# Patient Record
Sex: FEMALE | Race: BLACK OR AFRICAN AMERICAN
[De-identification: names, ages, dates, MRNs, and addresses within clinical notes are randomized per-mention and may not be internally consistent; named-entity substitution may affect disease eponyms.]

---

## 2018-08-20 ENCOUNTER — HOSPITAL ENCOUNTER (EMERGENCY)
Dept: HOSPITAL 61 - ER | Age: 28
Discharge: HOME | End: 2018-08-20
Payer: COMMERCIAL

## 2018-08-20 VITALS — WEIGHT: 176.25 LBS | HEIGHT: 63 IN | BODY MASS INDEX: 31.23 KG/M2

## 2018-08-20 VITALS — DIASTOLIC BLOOD PRESSURE: 67 MMHG | SYSTOLIC BLOOD PRESSURE: 140 MMHG

## 2018-08-20 DIAGNOSIS — H11.31: Primary | ICD-10-CM

## 2018-08-20 PROCEDURE — 99281 EMR DPT VST MAYX REQ PHY/QHP: CPT

## 2018-08-20 NOTE — PHYS DOC
Past Medical History


Past Medical History:  No Pertinent History


Past Surgical History:  Other


Additional Past Surgical Histo:  breast reduction


Alcohol Use:  Rarely


Drug Use:  Marijuana





Adult General


Chief Complaint


Chief Complaint:  EYE PROBLEMS





HPI


HPI





Patient is a 28  year old female who presents with redness to the right eye. 

Patient reports about 5 days ago she was running around her house "acting stupid

". Patient reports her bifold closet door was leaning up against the wall and 

not attached. She reports when she went by she bumped it and it fell, striking 

her in the face. Patient reports when she woke up the next morning her eye was 

red. She denies any pain or blurred vision. []





Review of Systems


Review of Systems





Constitutional: Denies fever or chills []


Eyes: Denies change in visual acuity. Reports redness of the right eye. Denies 

eye pain []


Cardiovascular: No additional information not addressed in HPI []


Integument: Denies rash or skin lesions []


Neurologic: Denies headache, focal weakness or sensory changes []








All other systems were reviewed and found to be within normal limits, except as 

documented in this note.





Allergies


Allergies





Allergies








Coded Allergies Type Severity Reaction Last Updated Verified


 


  No Known Drug Allergies    8/20/18 No











Physical Exam


Physical Exam





Constitutional: Well developed, well nourished, no acute distress, non-toxic 

appearance. []


HENT: Normocephalic, atraumatic


Eyes: PERRLA, EOMI, right subconjunctival hemorrhage present


Neck: Normal range of motion, no tenderness, supple, no stridor. [] 


Skin: Warm, dry, no erythema, no rash. [] 


Neurologic: Alert and oriented X 3, normal motor function, normal sensory 

function, no focal deficits noted. []


Psychologic: Affect normal, judgement normal, mood normal. []





Current Patient Data


Vital Signs





 Vital Signs








  Date Time  Temp Pulse Resp B/P (MAP) Pulse Ox O2 Delivery O2 Flow Rate FiO2


 


8/20/18 21:10 99.4 75 16 140/67 (91) 100 Room Air  





 99.4       











EKG


EKG


[]





Radiology/Procedures


Radiology/Procedures


[]





Course & Med Decision Making


Course & Med Decision Making


Pertinent Labs and Imaging studies reviewed. (See chart for details)





Plan: Supportive care, follow up with PCP, return precautions reviewed





Dragon Disclaimer


Dragon Disclaimer


This electronic medical record was generated, in whole or in part, using a 

voice recognition dictation system.





Departure


Departure


Impression:  


 Primary Impression:  


 Subconjunctival hemorrhage of right eye


Disposition:  01 HOME, SELF-CARE


Condition:  GOOD


Referrals:  


NON,STAFF (PCP)


Patient Instructions:  Subconjunctival Hemorrhage











ARASH MONTEMAYOR APRN Aug 20, 2018 21:25

## 2018-12-05 ENCOUNTER — HOSPITAL ENCOUNTER (EMERGENCY)
Dept: HOSPITAL 61 - ER | Age: 28
Discharge: HOME | End: 2018-12-05
Payer: COMMERCIAL

## 2018-12-05 VITALS — BODY MASS INDEX: 32.02 KG/M2 | WEIGHT: 174 LBS | HEIGHT: 62 IN

## 2018-12-05 VITALS — DIASTOLIC BLOOD PRESSURE: 55 MMHG | SYSTOLIC BLOOD PRESSURE: 115 MMHG

## 2018-12-05 DIAGNOSIS — O46.91: Primary | ICD-10-CM

## 2018-12-05 DIAGNOSIS — R10.2: ICD-10-CM

## 2018-12-05 LAB
% LYMPHS: 14 % (ref 24–48)
% MONOS: 6 % (ref 0–10)
% SEGS: 80 % (ref 35–66)
ANION GAP SERPL CALC-SCNC: 7 MMOL/L (ref 6–14)
BASOPHILS # BLD AUTO: 0.1 X10^3/UL (ref 0–0.2)
BASOPHILS NFR BLD: 1 % (ref 0–3)
BUN SERPL-MCNC: 5 MG/DL (ref 7–20)
CALCIUM SERPL-MCNC: 8.6 MG/DL (ref 8.5–10.1)
CHLORIDE SERPL-SCNC: 105 MMOL/L (ref 98–107)
CO2 SERPL-SCNC: 28 MMOL/L (ref 21–32)
CREAT SERPL-MCNC: 0.6 MG/DL (ref 0.6–1)
EOSINOPHIL NFR BLD: 0 % (ref 0–3)
EOSINOPHIL NFR BLD: 0.1 X10^3/UL (ref 0–0.7)
ERYTHROCYTE [DISTWIDTH] IN BLOOD BY AUTOMATED COUNT: 13.9 % (ref 11.5–14.5)
GFR SERPLBLD BASED ON 1.73 SQ M-ARVRAT: 146.2 ML/MIN
GLUCOSE SERPL-MCNC: 95 MG/DL (ref 70–99)
HCT VFR BLD CALC: 35.3 % (ref 36–47)
HGB BLD-MCNC: 11.6 G/DL (ref 12–15.5)
LYMPHOCYTES # BLD: 1.2 X10^3/UL (ref 1–4.8)
LYMPHOCYTES NFR BLD AUTO: 9 % (ref 24–48)
MCH RBC QN AUTO: 29 PG (ref 25–35)
MCHC RBC AUTO-ENTMCNC: 33 G/DL (ref 31–37)
MCV RBC AUTO: 87 FL (ref 79–100)
MONO #: 0.6 X10^3/UL (ref 0–1.1)
MONOCYTES NFR BLD: 4 % (ref 0–9)
NEUT #: 11.2 X10^3UL (ref 1.8–7.7)
NEUTROPHILS NFR BLD AUTO: 85 % (ref 31–73)
PLATELET # BLD AUTO: 291 X10^3/UL (ref 140–400)
PLATELET # BLD EST: ADEQUATE 10*3/UL
POTASSIUM SERPL-SCNC: 3.9 MMOL/L (ref 3.5–5.1)
RBC # BLD AUTO: 4.05 X10^6/UL (ref 3.5–5.4)
SODIUM SERPL-SCNC: 140 MMOL/L (ref 136–145)
WBC # BLD AUTO: 13.2 X10^3/UL (ref 4–11)

## 2018-12-05 PROCEDURE — 86901 BLOOD TYPING SEROLOGIC RH(D): CPT

## 2018-12-05 PROCEDURE — 99284 EMERGENCY DEPT VISIT MOD MDM: CPT

## 2018-12-05 PROCEDURE — 81025 URINE PREGNANCY TEST: CPT

## 2018-12-05 PROCEDURE — 86900 BLOOD TYPING SEROLOGIC ABO: CPT

## 2018-12-05 PROCEDURE — 85025 COMPLETE CBC W/AUTO DIFF WBC: CPT

## 2018-12-05 PROCEDURE — 36415 COLL VENOUS BLD VENIPUNCTURE: CPT

## 2018-12-05 PROCEDURE — 96374 THER/PROPH/DIAG INJ IV PUSH: CPT

## 2018-12-05 PROCEDURE — 84702 CHORIONIC GONADOTROPIN TEST: CPT

## 2018-12-05 PROCEDURE — 86850 RBC ANTIBODY SCREEN: CPT

## 2018-12-05 PROCEDURE — 80048 BASIC METABOLIC PNL TOTAL CA: CPT

## 2018-12-05 PROCEDURE — 76817 TRANSVAGINAL US OBSTETRIC: CPT

## 2018-12-05 PROCEDURE — 85007 BL SMEAR W/DIFF WBC COUNT: CPT

## 2018-12-05 PROCEDURE — 88305 TISSUE EXAM BY PATHOLOGIST: CPT

## 2018-12-05 RX ADMIN — FENTANYL CITRATE ONE MCG: 50 INJECTION INTRAMUSCULAR; INTRAVENOUS at 05:33

## 2018-12-05 NOTE — PHYS DOC
Past Medical History


Past Medical History:  No Pertinent History


Past Surgical History:  Other


Additional Past Surgical Histo:  breast reduction


Alcohol Use:  Rarely


Drug Use:  Marijuana





Adult General


Chief Complaint


Chief Complaint:  VAGINAL BLEEDING PREGNANCY





HPI


HPI





Patient is a 28  year old female who presents with possible miscarriage.  

Patient is a . She estimates to be at somewhere around 10 weeks gestation 

by her last menstrual period. She was evaluated recently by her primary 

obstetrician, Dr. Manning, and there was concern that the patient possibly would 

have miscarriage based on ultrasound findings present in the office. This was 3 

days earlier. Since that time, the patient has had persistent bleeding that has 

worsened. She has had crampy pelvic pain which she states feels like 

contractions. Her symptoms worsened overnight so she decided to come to the 

emergency department. No fever or chills. No lightheadedness. No palpitations. 

Denies urinary symptoms.





Review of Systems


Review of Systems





Constitutional: Denies fever or chills 


Eyes: Denies


HENT: Denies 


Respiratory: Denies 


Cardiovascular: denies


GI: pelvic cramps


: Denies dysuria


Musculoskeletal: Denies back pain 


Integument: Denies rash 


Neurologic: Denies headache


Endocrine: Denies polyuria 





All other systems were reviewed and found to be within normal limits, except as 

documented in this note.





Current Medications


Current Medications





Current Medications








 Medications


  (Trade)  Dose


 Ordered  Sig/Mario  Start Time


 Stop Time Status Last Admin


Dose Admin


 


 Fentanyl Citrate


  (Fentanyl 2ml


 Vial)  75 mcg  1X  ONCE  18 06:00


 18 06:23 DC  














Allergies


Allergies





Allergies








Coded Allergies Type Severity Reaction Last Updated Verified


 


  No Known Drug Allergies    18 No











Physical Exam


Physical Exam





Constitutional: Well developed, well nourished, no acute distress, non-toxic 

appearance


HENT: Normocephalic, atraumatic


Eyes: PERRLA 


Neck: Normal range of motion 


Cardiovascular:Heart rate regular rhythm, no murmur


Lungs & Thorax:  Bilateral breath sounds


Abdomen: Bowel sounds normal, soft 


Skin: Warm, dry, no erythema 


Back: No tenderness 


Extremities: No tenderness 


Neurologic: Alert and oriented X 3, normal motor function


Psychologic: Affect normal, judgement normal





Current Patient Data


Vital Signs





 Vital Signs








  Date Time  Temp Pulse Resp B/P (MAP) Pulse Ox O2 Delivery O2 Flow Rate FiO2


 


18 04:41 98.9 58 22 125/63 (83) 100 Room Air  





 98.9       








Lab Values





 Laboratory Tests








Test


 18


05:30 18


05:34 18


06:15


 


White Blood Count


 13.2 x10^3/uL


(4.0-11.0)  H 


 





 


Red Blood Count


 4.05 x10^6/uL


(3.50-5.40) 


 





 


Hemoglobin


 11.6 g/dL


(12.0-15.5)  L 


 





 


Hematocrit


 35.3 %


(36.0-47.0)  L 


 





 


Mean Corpuscular Volume


 87 fL ()


 


 





 


Mean Corpuscular Hemoglobin 29 pg (25-35)    


 


Mean Corpuscular Hemoglobin


Concent 33 g/dL


(31-37) 


 





 


Red Cell Distribution Width


 13.9 %


(11.5-14.5) 


 





 


Platelet Count


 291 x10^3/uL


(140-400) 


 





 


Neutrophils (%) (Auto) 85 % (31-73)  H  


 


Lymphocytes (%) (Auto) 9 % (24-48)  L  


 


Monocytes (%) (Auto) 4 % (0-9)    


 


Eosinophils (%) (Auto) 0 % (0-3)    


 


Basophils (%) (Auto) 1 % (0-3)    


 


Neutrophils # (Auto)


 11.2 x10^3uL


(1.8-7.7)  H 


 





 


Lymphocytes # (Auto)


 1.2 x10^3/uL


(1.0-4.8) 


 





 


Monocytes # (Auto)


 0.6 x10^3/uL


(0.0-1.1) 


 





 


Eosinophils # (Auto)


 0.1 x10^3/uL


(0.0-0.7) 


 





 


Basophils # (Auto)


 0.1 x10^3/uL


(0.0-0.2) 


 





 


Platelet Estimate Pending    


 


Maternal Serum HCG Beta


Subunit 4347 mIU/mL


(0-5)  H 


 





 


POC Urine HCG, Qualitative


 


 Hcg positive


(Negative) 





 


Sodium Level


 


 


 140 mmol/L


(136-145)


 


Potassium Level


 


 


 3.9 mmol/L


(3.5-5.1)


 


Chloride Level


 


 


 105 mmol/L


()


 


Carbon Dioxide Level


 


 


 28 mmol/L


(21-32)


 


Anion Gap   7 (6-14)  


 


Blood Urea Nitrogen


 


 


 5 mg/dL (7-20)


L


 


Creatinine


 


 


 0.6 mg/dL


(0.6-1.0)


 


Estimated GFR


(Cockcroft-Gault) 


 


 146.2  





 


Glucose Level


 


 


 95 mg/dL


(70-99)


 


Calcium Level


 


 


 8.6 mg/dL


(8.5-10.1)





 Laboratory Tests


18 05:30








 Laboratory Tests


18 06:15











EKG


EKG


[]





Radiology/Procedures


Radiology/Procedures


Pelvic US:  (after delivery of products of conception)





MPRESSION: 


 


1.   There is some heterogeneity of the endometrial stripe which measures 


up to about 12 mm with some suspected fluid within. This mild prominence 


could be secondary to some blood products within the region but cannot 


exclude a small amount of retained products. Follow-up could be obtained 


to ensure that there is appropriate thinning of the endometrium. There is 


a tiny focus of fluid within the measuring up to 3 mm. Could be secondary 


to a small amount of fluid within the endometrial stripe or a small cyst 


within the region. Given that the patient had a recent delivery causes 


such as early gestational sac considered unlikely. Given that this is at 


the lower uterine segment causes such as nabothian cyst also a possible 


cause.


 


2.  Small amount of free fluid is seen within the pelvis with a possible 


septation within.





Course & Med Decision Making


Course & Med Decision Making


Pertinent Labs and Imaging studies reviewed. (See chart for details)





Patient is seen in the emergency department for possible miscarriage. Pelvic 

exam was completed after IV pain medications were given. Products of conception 

were present at the cervical os. The patient was having pain and cramps. 

Forceps were used to gently remove the products. This was without difficulty. 

The patient tolerated well. Products of conception were sent to pathology.  

Patient has f/u appt with Dr. Manning scheduled for later today.  Discussed with 

Dr. Green, on call GYN, who recommended pain medications but no additional 

therapies at this time.  Plan is for d/c home.  All results are discussed with 

the patient and all of her questions are answered prior to discharge.





Dragon Disclaimer


Dragon Disclaimer


This electronic medical record was generated, in whole or in part, using a 

voice recognition dictation system.





Departure


Departure


Disposition:   HOME, SELF-CARE


Condition:  GOOD


Referrals:  


JULIETA MANNING MD (PCP)











VINCENT WYLIE DO Dec 5, 2018 05:35

## 2018-12-07 NOTE — PATHOLOGY
[facility] Accession Number: [id number]

.                                                                01

Material submitted:                                        .

PRODUCTS OF CONCEPTION DELIVERED FROM UTERUS

.                                                                01

Clinical history:                                          .

Spontaneous miscarriage

.                                                                02

**********************************************************************

Diagnosis:

Uterine contents:

- Products of conception, comprised of immature chorionic villi showing

focal prominent hydropic changes and intervillous hemorrhage, and segments

of decidual tissue showing foci of hemorrhage, necrosis, and acute

inflammation.

.

(JPM:mml; 12/07/18)

[facility]/12/07/2018

**********************************************************************

.                                                                02

Comment:

The majority of the chorionic villi are of normal caliber.  Some of the

villi show prominent hydropic changes.  Block A1 will be sent for DNA

ploidy studies to rule out a partial mole.  These results will be reported

separately.

.

(JPM:mml; 12/07/18)

.                                                                02

Electronically signed:                                     .

Giovani Garcia MD, Pathologist

NPI- [id number]

.                                                                01

Gross description:                                         .

Received in an unidentified bloody fluid labeled "Zora Hernandez,

products of conception," are multiple fragments of pink-tan to hemorrhagic

soft tissue measuring 9.0 x 5.8 x 0.8 cm in greatest dimensions.  Serial

sectioning reveals abundant blood clot on cut surfaces.  Fetal tissue is

not identified grossly.  Vesicular structures are absent.  Representative

tissue is submitted in cassettes A1 through A3.

([facility]; 12/6/2018)

XDC/XDC

.                                                                02

Pathologist provided ICD-10:

O02.89

.                                                                02

CPT                                                        .

[id number]

Specimen Comment: A courtesy copy of this report has been sent to

Specimen Comment: [phone], [phone], [phone].

Specimen Comment: Report sent to ,DR CAROLINA / DR MCKEON

***Performed at:  01

   [facility]

   [address], [place], [place]  [id number]

   MD Khanh sOborn MD Phone:  [phone]

***Performed at:  02

   [facility]

   [address] [place], [place]  [id number]

   MD Giovani Garcia MD Phone:  [phone]
